# Patient Record
Sex: MALE | Race: WHITE
[De-identification: names, ages, dates, MRNs, and addresses within clinical notes are randomized per-mention and may not be internally consistent; named-entity substitution may affect disease eponyms.]

---

## 2022-10-02 ENCOUNTER — HOSPITAL ENCOUNTER (INPATIENT)
Dept: HOSPITAL 95 - ER | Age: 57
LOS: 4 days | Discharge: HOME | DRG: 189 | End: 2022-10-06
Attending: HOSPITALIST | Admitting: HOSPITALIST
Payer: COMMERCIAL

## 2022-10-02 VITALS — BODY MASS INDEX: 29.44 KG/M2 | WEIGHT: 210.32 LBS | HEIGHT: 71 IN

## 2022-10-02 DIAGNOSIS — Z86.73: ICD-10-CM

## 2022-10-02 DIAGNOSIS — F17.210: ICD-10-CM

## 2022-10-02 DIAGNOSIS — I10: ICD-10-CM

## 2022-10-02 DIAGNOSIS — Z79.899: ICD-10-CM

## 2022-10-02 DIAGNOSIS — Z20.822: ICD-10-CM

## 2022-10-02 DIAGNOSIS — Z71.6: ICD-10-CM

## 2022-10-02 DIAGNOSIS — J43.9: ICD-10-CM

## 2022-10-02 DIAGNOSIS — Z79.51: ICD-10-CM

## 2022-10-02 DIAGNOSIS — J96.01: Primary | ICD-10-CM

## 2022-10-02 DIAGNOSIS — J96.02: ICD-10-CM

## 2022-10-02 DIAGNOSIS — Z79.82: ICD-10-CM

## 2022-10-02 DIAGNOSIS — F41.9: ICD-10-CM

## 2022-10-02 LAB
ALBUMIN SERPL BCP-MCNC: 4.1 G/DL (ref 3.4–5)
ALBUMIN/GLOB SERPL: 1.2 {RATIO} (ref 0.8–1.8)
ALT SERPL W P-5'-P-CCNC: 27 U/L (ref 12–78)
ANION GAP SERPL CALCULATED.4IONS-SCNC: 8 MMOL/L (ref 6–16)
AST SERPL W P-5'-P-CCNC: 11 U/L (ref 12–37)
BASOPHILS # BLD AUTO: 0.1 K/MM3 (ref 0–0.23)
BASOPHILS NFR BLD AUTO: 1 % (ref 0–2)
BILIRUB SERPL-MCNC: 1.1 MG/DL (ref 0.1–1)
BUN SERPL-MCNC: 17 MG/DL (ref 8–24)
CALCIUM SERPL-MCNC: 9.3 MG/DL (ref 8.5–10.1)
CHLORIDE SERPL-SCNC: 108 MMOL/L (ref 98–108)
CO2 SERPL-SCNC: 24 MMOL/L (ref 21–32)
CREAT SERPL-MCNC: 1.19 MG/DL (ref 0.6–1.2)
DEPRECATED RDW RBC AUTO: 43.9 FL (ref 35.1–46.3)
EOSINOPHIL # BLD AUTO: 0.89 K/MM3 (ref 0–0.68)
EOSINOPHIL NFR BLD AUTO: 9 % (ref 0–6)
ERYTHROCYTE [DISTWIDTH] IN BLOOD BY AUTOMATED COUNT: 13 % (ref 11.7–14.2)
FLUAV RNA SPEC QL NAA+PROBE: NEGATIVE
FLUBV RNA SPEC QL NAA+PROBE: NEGATIVE
GLOBULIN SER CALC-MCNC: 3.3 G/DL (ref 2.2–4)
GLUCOSE SERPL-MCNC: 108 MG/DL (ref 70–99)
HCT VFR BLD AUTO: 48.5 % (ref 37–53)
HGB BLD-MCNC: 16.6 G/DL (ref 13.5–17.5)
IMM GRANULOCYTES # BLD AUTO: 0.04 K/MM3 (ref 0–0.1)
IMM GRANULOCYTES NFR BLD AUTO: 0 % (ref 0–1)
LYMPHOCYTES # BLD AUTO: 1.5 K/MM3 (ref 0.84–5.2)
LYMPHOCYTES NFR BLD AUTO: 14 % (ref 21–46)
MCHC RBC AUTO-ENTMCNC: 34.2 G/DL (ref 31.5–36.5)
MCV RBC AUTO: 91 FL (ref 80–100)
MONOCYTES # BLD AUTO: 0.51 K/MM3 (ref 0.16–1.47)
MONOCYTES NFR BLD AUTO: 5 % (ref 4–13)
NEUTROPHILS # BLD AUTO: 7.41 K/MM3 (ref 1.96–9.15)
NEUTROPHILS NFR BLD AUTO: 71 % (ref 41–73)
NRBC # BLD AUTO: 0 K/MM3 (ref 0–0.02)
NRBC BLD AUTO-RTO: 0 /100 WBC (ref 0–0.2)
PH BLDA: 7.47 [PH] (ref 7.35–7.45)
PLATELET # BLD AUTO: 238 K/MM3 (ref 150–400)
POTASSIUM SERPL-SCNC: 4.1 MMOL/L (ref 3.5–5.5)
PROT SERPL-MCNC: 7.4 G/DL (ref 6.4–8.2)
RSV RNA SPEC QL NAA+PROBE: NEGATIVE
SARS-COV-2 RNA RESP QL NAA+PROBE: NEGATIVE
SODIUM SERPL-SCNC: 140 MMOL/L (ref 136–145)

## 2022-10-02 PROCEDURE — A9270 NON-COVERED ITEM OR SERVICE: HCPCS

## 2022-10-02 NOTE — NUR
ASSUMED CARE:
 
MED FLOOR STAFF CALLED PCU CHARGE RN TO STATE THAT THEY NEEDED TO TRANSFER A
PT FOR RESPIRATORY DISTRESS. THIS RN WENT TO BEDSIDE AND PT WAS ON 3L NC,
RESPIRATORY RATE 30S-40S. ACCESSORY MUSCLE USE NOTED. TACHYCARDIA AND
HYPERTENSIVE. DR HU AT BEDSIDE AND INSTRUCTED FOR PCU TRANSFER, BIPAP AND A
DOSE OF MORPHINE. PT PLACED ON BIPAP 12/8 AND 40% FIO2. RR NOW IN 16-24. HR
REMAINS 120S. CALL TO DR HU AND RECIEVED ORDERS FOR LABETOLOL AND COVID
SWAB.  STATES PT'S XRAY DOES NOT SHOW NEED FOR ABX AND  FEELS THIS IS
EMPHYSEMA AND COPD EXACERBATION. WHILE DISCUSSING CODE STATUS PT STATED HE
WANTED TO BE DNR. STATES HE UNDERSTANDS THAT MEANS NO COMPRESSIONS, NO
BREATHING TUBE, NO SHOCK, NO MEDS TO GET HEART RESTARTED. PT STATES
UNDERSTANDING. CALL TO DR HU FOR ORDER CHANGE. PT CURRENTLY IN PCU BED, CALL
LIGHT IN REACH. TRANSFER VIA BED FROM MED FLOOR TO PCU WITH RT ASSISTING AND 3
RNS. CHARGE RN AWARE OF PT CONDITION.

## 2022-10-02 NOTE — NUR
PT ASKED THAT HIS SISTER GERHARD BE NOTIFIED OF TRANSFER. PT ALSO ASKED THAT HIS
NEICE BE MADE AWARE. GERHARD WAS GIVEN ROOM NUMBER AND UPDATE. STATES SHE WILL
CALL TO CHECK ON HIM IN AM.

## 2022-10-02 NOTE — NUR
REPORT FROM [facility] ER RN, PATIENT TO FLOOR AT 1705, RESPIRATORY DISTRESS, SATS
90% ON 3L O2, RESPIRATION 38, HEART RATE 120S, ACCESSORY MUSCLES USED, PATIENT
UNABLE TO TALK, SBP 200S. NOTIFIED CHARGE RN, PATIENT BEING TRANSFERED TO PCU
20, REPORT TO JACQUI PCU RN. DR HU NOTIFED, MORPHINE GIVEN, BIPAP STARTED, ABG
DONE, COVID SWAB SENT

## 2022-10-03 LAB
ANION GAP SERPL CALCULATED.4IONS-SCNC: 7 MMOL/L (ref 6–16)
BUN SERPL-MCNC: 32 MG/DL (ref 8–24)
CALCIUM SERPL-MCNC: 9.8 MG/DL (ref 8.5–10.1)
CHLORIDE SERPL-SCNC: 108 MMOL/L (ref 98–108)
CO2 SERPL-SCNC: 22 MMOL/L (ref 21–32)
CREAT SERPL-MCNC: 1.21 MG/DL (ref 0.6–1.2)
GLUCOSE SERPL-MCNC: 150 MG/DL (ref 70–99)
PH BLDA: 7.34 [PH] (ref 7.35–7.45)
PH BLDA: 7.35 [PH] (ref 7.35–7.45)
POTASSIUM SERPL-SCNC: 5 MMOL/L (ref 3.5–5.5)
SODIUM SERPL-SCNC: 137 MMOL/L (ref 136–145)

## 2022-10-03 NOTE — NUR
SHIFT SUMMARY
 
PT A&Ox4, CALLS AND COMMUNIACTES NEEDS APPROPRIATELY. SR-ST 's, BP
ELEVATED, MANAGING PER EMAR. SpO2>92% ON BIPAP 10/6 40%Fi02, SEE PREVIOUS RN's
NOTE. NO OTHER EVENTS THIS SHIFT, PT NOW RESTING COMFORTABLY. PT REMAINED
BEDREST THIS SHIFT D/T OXYGEN DEMAND. WILL REPORT TO DAY SHIFT RN.

## 2022-10-03 NOTE — NUR
UPDATE
PT IN ROOM W C/O FEELING AS THOUGH HE COULD NOT CATCH HIS BREATH DESPITE BEING
ON BIPAP. RT CONTACTED AND ASSESSED THE PT AT BEDSIDE. PT BEGAN HAVING A
PANICK ATTACK AND HIS BP ELEVATED W SBP >190. PROVIDER CONTACTED AND ASSESSED
THE PT AT BEDSIDE. NEW ORDERS FOR CHEST XRAY, STEROIDS, AND REPEAT ABG'S WERE
GIVEN. RT ABLE TO ADJUST THE BIPAP AND STABALIZE THE PT. DURING THIS EVENT THE
PT VERBALIZED HIS DESIRE TO BE A FULL CODE AND BE INTUBATED IF NEEDED. CODE
STATUS CHANGED. PT CURRENTLY RESTING IN BED REPORTING THAT HE FEELS MUCH
BETTER.

## 2022-10-03 NOTE — NUR
SHIFT SUMMARY
PT REMAINS ALERT AND ORIENTED. PT HAS BEEN OFF BIPAP AND ON 3L NC ALL SHIFT
WITH SATS >90%. PT REPORTS FEELING SOB AT TIMES, BUT BREATHING TREATMENTS
HELP. LS WHEEZES THROUGHOUT. BP STABLE. HR NSR. PT DENIES ANY PAIN. PT ABLE TO
VOID USING THE URINAL. PT REPOSITIONING HIMSELF INDEPENDENTLY. NO OTHER ACUTE
CHANGES THIS SHIFT. WILL CONTINUE TO MONITOR AND REPORT TO ONCOMING RN

## 2022-10-03 NOTE — NUR
ASSUMED CARE
PT ON BIPAP UPON ASSESSMENT WITH SETTINGS 10/6 AND 40% FIO2. RR IS THE MID
20'S. BP ELEVATED. HR NSR 90'S. PT HAS EXP WHEEZES THROUGHOUT. DRY COUGH
NOTED. PT DENIES ANY ESTRELLA. PT ABLE TO REPOSITION HIMSELF IN THE BED. PT
TRIALED ON 4L NC FOR BREAKFAST AND TOLERATING AT THIS TIME WITH SATURATION IN
THE MID 90'S. WILL CONTINUE TO MONITOR CLOSELY.

## 2022-10-04 LAB
ANION GAP SERPL CALCULATED.4IONS-SCNC: 7 MMOL/L (ref 6–16)
BUN SERPL-MCNC: 43 MG/DL (ref 8–24)
CALCIUM SERPL-MCNC: 9 MG/DL (ref 8.5–10.1)
CHLORIDE SERPL-SCNC: 107 MMOL/L (ref 98–108)
CO2 SERPL-SCNC: 24 MMOL/L (ref 21–32)
CREAT SERPL-MCNC: 1.26 MG/DL (ref 0.6–1.2)
GLUCOSE SERPL-MCNC: 150 MG/DL (ref 70–99)
POTASSIUM SERPL-SCNC: 4.7 MMOL/L (ref 3.5–5.5)
SODIUM SERPL-SCNC: 138 MMOL/L (ref 136–145)

## 2022-10-04 NOTE — NUR
ASSUMED CARE OF PT AT 1600. PT IS PLEASANT AND A&O. PT HAD FRIENDS AT
BEDSIDE IN THE AFTERNOON. PT HAS HAD NO COMPLAINTS OF PAIN OR SOB. BED IN
LOWEST POSITION AND CALL LIGHT IN REACH.

## 2022-10-04 NOTE — NUR
SHIFT SUMMARY
PT REMAINS ALERT AND ORIENTED. ATTEMPTED TO TITRATE PT TO ROOM AIR, BUT PT
DESATURATED. PT ON 2L NC. BP STABLE. PT DENIES ANY PAIN. PT ABLE TO TOLERATE
WALKING INTO THE BATHROOM AND TAKING A SHOWER. STATUS CHANGED TO MEDICAL.
REPORT GIVEN TO JELENA VALENTINE

## 2022-10-04 NOTE — NUR
SHFIT SUMMARY
 
PT A&Ox4, CALLS AND COMMUNICATES NEEDS APPROPRIATELY. VSS, SR 80's. SpO2> 92%
RA. PT DESATURATED TO 87% A FEW TIMES WHILE SLEEPING, SpO2 REMAINED > 90% WITH
2L WHILE ASLEEP. BLADDER SCAN SHOWED PT WAS RETAINING ABOUT 1,100mls OF URINE,
CALL TO MD WITH ORDERS TO STRAIGHT CATH AND ADMINISTER FLOWMAX. STRAIGHT CATH
WAS PREFORMED AND FLOW MAX ADMINISTERED, PT ABLE TO VOID AFTER THESE
INTERVENTIONS. NO ACUTE EVENTS. WILL REPORT TO DAY SHIFT RN.

## 2022-10-04 NOTE — NUR
Pt is sitting on the EOB and alert. Pt tells me about his medical problems and
his frustration he has with living with those problems. He talks about his
poor support system (He has a couple of sisters who both have similar health
issues), his disinterest in Restorationist, mindfulness practices or spiritual
practices and relies on his own inner strength to get through tough things. I
highlight that he has voer come many obstacles in the past and that strength
will serve him well now. I provide therapeutic listening, gentle  and a
calming presence. Pt voices appreciation for the visit.

## 2022-10-05 NOTE — NUR
PT RESTED QUIETLY FOR THE ENTIRETY OF THE SHIFT. IN THE MORNING PT WAS MILDLY
AGITATED THAT HE WAS NOT ABLE TO GO HOME TODAY AND MAY NEED TO SPEND MORE TIME
IN THE HOSPITAL, BUT HE IS AGREEABLE TO CONTINUE WITH HIS STAY. HIS VITALS
HAVE BEEN STABLE, LUNGS WERE NOTED TO HAVE EXPIRATORY WHEEZING. HE HAD ONE
EPISODE OF SOB, BREATHING TREATMENT ADMINISTERED AND SOB RESOLVED. HE HAS BEEN
ON 2L O2 NASAL CANNULA AND MAINTAINING NORMAL SATURATIONS. HE DENIES ANY PAIN.
BOWEL SOUNDS WERE HYPOACTIVE. IV PATENT AND WNL. HE HAS NO ACUTE NEEDS AT THIS
TIME AND CALL LIGHT IS WITHIN REACH.

## 2022-10-05 NOTE — NUR
PT DID NOT USE URINAL TO VOID SO OUTPUT IS NOT MEASURED. HE REPORTS THAT HE
VOIDED 4 TIMES THIS SHIFT.

## 2022-10-05 NOTE — NUR
NOC SHIFT SUMMARY
 
PT SLEPT WELL OVERNIGHT. NO COMPLAINTS OF PAIN OR DISCOMFORT. ON 2L NC. VSS
PER PT TREND. BREATHING TX PER RT. PT REPORTS VOIDING MULTIPLE TIMES
THROUGHOUT THE NIGHT BUT EDUCATED ON IMPORTANCE OF I/O COLLECTION AND PROVIDED
URINAL IN BATHROOM. PT VERBALIZED UNDERSTANDING.
 
WILL PASS ON TO DAY RN

## 2022-10-05 NOTE — NUR
OBTAINED VITAL SIGNS. VSS. PT HAD ONE EPIDOSE OF SOB AND WAS TACHYPNEIC. HE
RECEIVED A BREATHING TREATMENT AND THEN DENIED ANY SOB. MILD EXPIRATORY
WHEEZES NOTED UPON MORNING ASSESSMENT, ALL OTHER FINDINGS WNL. HE HAS NO ACUTE
NEEDS AT THIS TIME AND DENIES PAIN. CALL LIGHT WITHIN REACH.

## 2022-10-06 NOTE — NUR
NOC SHIFT SUMMARY
 
PT SLEPT OVERNIGHT, ORIENTED X4, VSS PER PT TREND. ON 2L NC. NO COMPLAINTS OF
PAIN OR DISCOMFORT. Q4 NEBS PER RT. IV SOLUMEDROL.
 
WILL PASS ON TO DAY RN

## 2022-10-06 NOTE — NUR
DISCHARGE SUMMARY
 
PT AMBULATED FROM FACILITY WITH THIS RN AS AN ESCORT. ALL PERSONAL BELONGINGS
AND DISCHARGE INSTRUCTIONS WERE IN THE PT'S POSSESSION AT THE TIME OF
DISCHARGE. ALL QUESTIONS AND CONCERNS WERE ADDRESSED PRIOR TO DISCHARGE. PT
STATED AN UNDERSTANDING OF DISCHARGE INSTRUCTIONS AND VERBALIZED WHERE THEY
COULD CONTACT WITH FURTHER QUESTIONS OR CONCERNS.

## 2022-12-12 ENCOUNTER — HOSPITAL ENCOUNTER (INPATIENT)
Dept: HOSPITAL 95 - ER | Age: 57
LOS: 9 days | Discharge: HOME | DRG: 193 | End: 2022-12-21
Attending: HOSPITALIST | Admitting: HOSPITALIST
Payer: COMMERCIAL

## 2022-12-12 VITALS — HEIGHT: 72 IN | WEIGHT: 208.34 LBS | BODY MASS INDEX: 28.22 KG/M2

## 2022-12-12 DIAGNOSIS — Z20.822: ICD-10-CM

## 2022-12-12 DIAGNOSIS — J44.1: ICD-10-CM

## 2022-12-12 DIAGNOSIS — R65.10: ICD-10-CM

## 2022-12-12 DIAGNOSIS — I10: ICD-10-CM

## 2022-12-12 DIAGNOSIS — Z79.51: ICD-10-CM

## 2022-12-12 DIAGNOSIS — F17.210: ICD-10-CM

## 2022-12-12 DIAGNOSIS — N40.0: ICD-10-CM

## 2022-12-12 DIAGNOSIS — Z86.73: ICD-10-CM

## 2022-12-12 DIAGNOSIS — Z71.6: ICD-10-CM

## 2022-12-12 DIAGNOSIS — F41.9: ICD-10-CM

## 2022-12-12 DIAGNOSIS — F10.20: ICD-10-CM

## 2022-12-12 DIAGNOSIS — J96.02: ICD-10-CM

## 2022-12-12 DIAGNOSIS — J10.1: Primary | ICD-10-CM

## 2022-12-12 DIAGNOSIS — Z79.01: ICD-10-CM

## 2022-12-12 DIAGNOSIS — I51.7: ICD-10-CM

## 2022-12-12 DIAGNOSIS — J96.01: ICD-10-CM

## 2022-12-12 DIAGNOSIS — K21.9: ICD-10-CM

## 2022-12-12 DIAGNOSIS — Z79.02: ICD-10-CM

## 2022-12-12 DIAGNOSIS — Z79.899: ICD-10-CM

## 2022-12-12 DIAGNOSIS — Z87.01: ICD-10-CM

## 2022-12-12 DIAGNOSIS — Z79.52: ICD-10-CM

## 2022-12-12 LAB
ANION GAP SERPL CALCULATED.4IONS-SCNC: 7 MMOL/L (ref 6–16)
BASOPHILS # BLD AUTO: 0.08 K/MM3 (ref 0–0.23)
BASOPHILS NFR BLD AUTO: 1 % (ref 0–2)
BUN SERPL-MCNC: 16 MG/DL (ref 8–24)
CA-I BLD-SCNC: 1.08 MMOL/L (ref 1.1–1.46)
CALCIUM SERPL-MCNC: 8.9 MG/DL (ref 8.5–10.1)
CHLORIDE BLD-SCNC: 104 MMOL/L (ref 98–108)
CHLORIDE SERPL-SCNC: 106 MMOL/L (ref 98–108)
CO2 BLD-SCNC: 22 MMOL/L (ref 21–32)
CO2 SERPL-SCNC: 24 MMOL/L (ref 21–32)
CREAT BLD-MCNC: 1.4 MG/DL (ref 0.8–1.3)
CREAT SERPL-MCNC: 1.38 MG/DL (ref 0.6–1.2)
DEPRECATED RDW RBC AUTO: 42.6 FL (ref 35.1–46.3)
EOSINOPHIL # BLD AUTO: 0.2 K/MM3 (ref 0–0.68)
EOSINOPHIL NFR BLD AUTO: 2 % (ref 0–6)
ERYTHROCYTE [DISTWIDTH] IN BLOOD BY AUTOMATED COUNT: 12.9 % (ref 11.7–14.2)
FLUAV RNA SPEC QL NAA+PROBE: POSITIVE
FLUBV RNA SPEC QL NAA+PROBE: NEGATIVE
GLUCOSE BLDC GLUCOMTR-MCNC: 96 MG/DL (ref 70–99)
GLUCOSE SERPL-MCNC: 104 MG/DL (ref 70–99)
HCT VFR BLD AUTO: 48.2 % (ref 37–53)
HCT VFR BLD CALC: 50 % (ref 41–53)
HGB BLD CALC-MCNC: 17 G/DL (ref 13.5–17.5)
HGB BLD-MCNC: 16.5 G/DL (ref 13.5–17.5)
IMM GRANULOCYTES # BLD AUTO: 0.03 K/MM3 (ref 0–0.1)
IMM GRANULOCYTES NFR BLD AUTO: 0 % (ref 0–1)
LYMPHOCYTES # BLD AUTO: 0.76 K/MM3 (ref 0.84–5.2)
LYMPHOCYTES NFR BLD AUTO: 7 % (ref 21–46)
MCHC RBC AUTO-ENTMCNC: 34.2 G/DL (ref 31.5–36.5)
MCV RBC AUTO: 90 FL (ref 80–100)
MONOCYTES # BLD AUTO: 1.03 K/MM3 (ref 0.16–1.47)
MONOCYTES NFR BLD AUTO: 10 % (ref 4–13)
NEUTROPHILS # BLD AUTO: 8.3 K/MM3 (ref 1.96–9.15)
NEUTROPHILS NFR BLD AUTO: 80 % (ref 41–73)
NRBC # BLD AUTO: 0 K/MM3 (ref 0–0.02)
NRBC BLD AUTO-RTO: 0 /100 WBC (ref 0–0.2)
PH BLDV: 7.36 [PH] (ref 7.34–7.37)
PH BLDV: 7.37 [PH] (ref 7.34–7.37)
PLATELET # BLD AUTO: 180 K/MM3 (ref 150–400)
POTASSIUM BLD-SCNC: 4.2 MMOL/L (ref 3.5–5.5)
POTASSIUM SERPL-SCNC: 4.2 MMOL/L (ref 3.5–5.5)
RSV RNA SPEC QL NAA+PROBE: NEGATIVE
SARS-COV-2 RNA RESP QL NAA+PROBE: NEGATIVE
SODIUM BLD-SCNC: 138 MMOL/L (ref 135–148)
SODIUM SERPL-SCNC: 137 MMOL/L (ref 136–145)

## 2022-12-12 PROCEDURE — C9113 INJ PANTOPRAZOLE SODIUM, VIA: HCPCS

## 2022-12-12 PROCEDURE — 5A09457 ASSISTANCE WITH RESPIRATORY VENTILATION, 24-96 CONSECUTIVE HOURS, CONTINUOUS POSITIVE AIRWAY PRESSURE: ICD-10-PCS | Performed by: HOSPITALIST

## 2022-12-12 PROCEDURE — A9270 NON-COVERED ITEM OR SERVICE: HCPCS

## 2022-12-12 NOTE — NUR
LETI JUST POPPED OFF HIS MASK AND JUMPED UP OUT OF BED TO USE THE URINAL, WAS
ABLE TO GET HIM TO HOLD FOR ASSISTANCE DUE TO CORDS. HE DID NOT HAVE MASK ON
DUE TO BEING ALL TANGLED. IN THE TIME HE USED THE URINAL HIS SATS DROPPED DOWN
TO 70%. IT TOOK HIM 3 MINUTES TO FULLY RECOVER TO 99% BUT HE STILL IS VERY
LABORED BREATHING HAVING TO POSTURE UPRIGHT AND FORWARD. STATES HE IS NOT ABLE
TO LEAN BACK YET. RT IN ROOM TO GIVE BREATHING TREATMENT.

## 2022-12-12 NOTE — NUR
ICU ADMISSION / SHIFT SUMMARY:
REPORT RECEIVED FROM JOSE LUIS SORENSON RN IN ED. PT ARRIVED TO ICU-02 AT APPROX 1700.
ON ARRIVAL, THE PT IS VISIBLY DYSPNEIC & USING ACCESSORY MUSCLES WHILE
BREATHING. HE IS REQUESTING THAT BIPAP BE REMOVED & REPEATING THAT HE IS "TOO
HOT." ATIVAN GIVEN PER EMAR IN ED W/ SOME RELIEF OF ANXIETY. BIPAP IN PLACE
W/ SETTINGS: 14/7 & 35% FIO2, O2 SATS > 92% ON AVG.  EXPIRATORY WHEEZING NOTED
T/O ON AUSCULTATION. MONITOR SHOWS ST W/ -120s, HTN INCREASED W/
ANXIETY. ABDOMEN IS DISTENDED, TENDER TO PALPATION. PT STS HAVING NO BM "FOR A
FEW DAYS." STS NO ISSUES W/ VOIDING URINE, NO VOID SINCE ADMIT. SKIN OVERALL
INTACT, DIAPHORESIS WORSENED W/ ANXIETY.
PT HAS REMOVED BIPAP W/O NOTIFYING STAFF, STS HE "NEEDED A BREAK." O2 SATS
MAINTAINING 92% BUT THE PT's WORK OF BREATHING & ACCESSORY MUSCLE USE HAS
INCREASED DURING THIS TIME. HE IS TRIPODING & STS BEING UNABLE TO BREATH.
WHEEZING IS AUDIBLE FROM THE DOORWAY. THIS RN HAS PLACED BIPAP BACK ON THE PT
& CONTACTED NAHID CARLSON, RT, FOR A BREATHING TX. UPON FURTHER EVALUATION, THIS RN
HAS CONTACTED HOSPITALIST PROVIDER REQUESTING INTENSIVIST CONSULTATION. ORDERS
PLACED & DR HIGUERA HAS BEEN NOTIFIED. UPDATED HER ON COMPLETED INTERVENTIONS.
ORDERS FOR PRECEDEX & TAMIFLU PLACED AT THIS TIME.
WILL CONTINUE TO MONITOR & REPORT OFF TO ONCOMING RN.

## 2022-12-12 NOTE — NUR
ASSUMED CARE. PT ALERT WITH BIPAP IN PLACE, SETTINGS 12/7 FIO2 35%, RESP RATE
IN THE 30'S, LABORED, SATS 98%. RT WAS IN ROOM STATES SEVERE WHEEZEING NOTED.
SO FAR TOLERATING BIPAP, PRECEDEX GTT AT 0.4MCQ. LR INFUSING AT 75ML/HR.
STATES THE WORK OF BREATHING COMES AND GOES. SINUS ON MONITOR, BP HYPERTENSIVE
228/215, WILL CALL MD REGARDING PRESSURES.

## 2022-12-12 NOTE — NUR
SISTER NALDO CALLED BACK AND INFORMED OF PATIENTS UPDATE PER PATIENT
VERBALIZED CONSENT. PRECEDEX HAS KICKED IN AND PATIENT IS STARTING TO REST.
STILL LABORED BREATHING IN THE 30'S BUT TOLERATING THE BIPAP EASIER. BP HAS
COME DOWN TO /80'S, SATS AT 97%. WILL CONTINUE TO MONITOR

## 2022-12-13 LAB
ALBUMIN SERPL BCP-MCNC: 3.5 G/DL (ref 3.4–5)
ALBUMIN/GLOB SERPL: 0.9 {RATIO} (ref 0.8–1.8)
ALT SERPL W P-5'-P-CCNC: 23 U/L (ref 12–78)
ANION GAP SERPL CALCULATED.4IONS-SCNC: 7 MMOL/L (ref 6–16)
AST SERPL W P-5'-P-CCNC: 18 U/L (ref 12–37)
BASOPHILS # BLD AUTO: 0.01 K/MM3 (ref 0–0.23)
BASOPHILS NFR BLD AUTO: 0 % (ref 0–2)
BILIRUB SERPL-MCNC: 0.6 MG/DL (ref 0.1–1)
BUN SERPL-MCNC: 29 MG/DL (ref 8–24)
CALCIUM SERPL-MCNC: 8.7 MG/DL (ref 8.5–10.1)
CHLORIDE SERPL-SCNC: 106 MMOL/L (ref 98–108)
CO2 SERPL-SCNC: 24 MMOL/L (ref 21–32)
CREAT SERPL-MCNC: 1.12 MG/DL (ref 0.6–1.2)
DEPRECATED RDW RBC AUTO: 43.7 FL (ref 35.1–46.3)
EOSINOPHIL # BLD AUTO: 0 K/MM3 (ref 0–0.68)
EOSINOPHIL NFR BLD AUTO: 0 % (ref 0–6)
ERYTHROCYTE [DISTWIDTH] IN BLOOD BY AUTOMATED COUNT: 12.9 % (ref 11.7–14.2)
GLOBULIN SER CALC-MCNC: 3.7 G/DL (ref 2.2–4)
GLUCOSE SERPL-MCNC: 148 MG/DL (ref 70–99)
HCT VFR BLD AUTO: 46 % (ref 37–53)
HGB BLD-MCNC: 15.7 G/DL (ref 13.5–17.5)
IMM GRANULOCYTES # BLD AUTO: 0.01 K/MM3 (ref 0–0.1)
IMM GRANULOCYTES NFR BLD AUTO: 0 % (ref 0–1)
LYMPHOCYTES # BLD AUTO: 0.31 K/MM3 (ref 0.84–5.2)
LYMPHOCYTES NFR BLD AUTO: 4 % (ref 21–46)
MAGNESIUM SERPL-MCNC: 2.5 MG/DL (ref 1.6–2.4)
MCHC RBC AUTO-ENTMCNC: 34.1 G/DL (ref 31.5–36.5)
MCV RBC AUTO: 92 FL (ref 80–100)
MONOCYTES # BLD AUTO: 0.14 K/MM3 (ref 0.16–1.47)
MONOCYTES NFR BLD AUTO: 2 % (ref 4–13)
NEUTROPHILS # BLD AUTO: 6.9 K/MM3 (ref 1.96–9.15)
NEUTROPHILS NFR BLD AUTO: 94 % (ref 41–73)
NRBC # BLD AUTO: 0 K/MM3 (ref 0–0.02)
NRBC BLD AUTO-RTO: 0 /100 WBC (ref 0–0.2)
PHOSPHATE SERPL-MCNC: 4.9 MG/DL (ref 2.5–4.9)
PLATELET # BLD AUTO: 156 K/MM3 (ref 150–400)
POTASSIUM SERPL-SCNC: 5.3 MMOL/L (ref 3.5–5.5)
PROT SERPL-MCNC: 7.2 G/DL (ref 6.4–8.2)
SODIUM SERPL-SCNC: 137 MMOL/L (ref 136–145)

## 2022-12-13 NOTE — NUR
SHIFT SUMMARY: MILDLY SEDATED ON PRECEDEX GTT OF 0.4MCQ WHICH HAS HELPED WITH
ANXIETY. STILL HAD TO GIVE 1 DOSE OF 1MG ATIVAN THIS MORNING TO HELP. HE WAS
IMPULSIVE 1 TIME RIPPING OFF BIPAP TO GET UP AND USE BATHROOM, WAS OFF BIPAP
ONLY FOR FEW MIN, DROPPING SATS DOWN TO 70%. IT TOOK HIM SEVERAL MINUTES TO
GET CAUGHT BACK UP. CONTINUES TO HAVE VERY AUDIBLE WHEEZES EVEN ON BIPAP.
SETTINGS WERE INCREASED TO 16/7/25% TO KEEP SATS >95%. STILL NO SPUTUM FOR
CULTURE. ONLY URINATED ONCE WHICH WAS VERY DARK CHRISTINA. HE DID NEED NEBULIZER
TREATMENTS CONSISTENT T/O NIGHT. RESP REMAIN IN THE HIGH 28-35. NEW IV PLACED
IN RIGHT AC, HAND IV DC'D. WILL REPORT TO DAYSHIFT.

## 2022-12-13 NOTE — NUR
ASSUMED CARE OF PATIENT, HE IS RESTING QUIETLY ON PRECEDEX @ 0.4MCG/KG, IV
FLUIDS, BIPAP 16/7 30%. DENIES ANY NEEDS AT THIS TIME. SEE ASSESSMENT.

## 2022-12-13 NOTE — NUR
Ashe of Care:
 
Care assumed at 0700hr. Patient sleeping, but easily roused to verbal stimuli,
becomes a/o x4. On BiPAP at 16/7/35%, spO2-%. States breathing feels
comfortable on BiPAP. Then gave patient break from BiPAP mask to 4L/NC.
Patient tolerated break for approx 1 1/2 hr, before becoming SOB and
SpO2-70's. Patient placed back on BiPAP mask and quickly recovered. PRN ativan
given with good effect. All other VSS. Call light in reach, makes needs known.
Will continue to monitor.

## 2022-12-13 NOTE — NUR
ASSUMPTION OF CARE
 
PT IS ALERT, ON CONTINUOUS BIPAP. PRECEDEX GTT AT 0.4. SR ON THE CARDIAC
MONITOR. BP WNL. OXYGEN SAT >95% AT THIS TIME. HE IS ORIENTED AND ABLE TO
FOLLOW COMMANDS. HE DENIES PAIN AT THE TIME OF ASSUMPTION OF CARE. HE IS
AFEBRILE.

## 2022-12-14 LAB
ANION GAP SERPL CALCULATED.4IONS-SCNC: 5 MMOL/L (ref 6–16)
BASOPHILS # BLD AUTO: 0.01 K/MM3 (ref 0–0.23)
BASOPHILS NFR BLD AUTO: 0 % (ref 0–2)
BUN SERPL-MCNC: 37 MG/DL (ref 8–24)
CALCIUM SERPL-MCNC: 8.6 MG/DL (ref 8.5–10.1)
CHLORIDE SERPL-SCNC: 108 MMOL/L (ref 98–108)
CO2 SERPL-SCNC: 26 MMOL/L (ref 21–32)
CREAT SERPL-MCNC: 0.98 MG/DL (ref 0.6–1.2)
DEPRECATED RDW RBC AUTO: 44.1 FL (ref 35.1–46.3)
EOSINOPHIL # BLD AUTO: 0 K/MM3 (ref 0–0.68)
EOSINOPHIL NFR BLD AUTO: 0 % (ref 0–6)
ERYTHROCYTE [DISTWIDTH] IN BLOOD BY AUTOMATED COUNT: 13 % (ref 11.7–14.2)
GLUCOSE SERPL-MCNC: 167 MG/DL (ref 70–99)
HCT VFR BLD AUTO: 45.5 % (ref 37–53)
HGB BLD-MCNC: 15 G/DL (ref 13.5–17.5)
IMM GRANULOCYTES # BLD AUTO: 0.04 K/MM3 (ref 0–0.1)
IMM GRANULOCYTES NFR BLD AUTO: 0 % (ref 0–1)
LYMPHOCYTES # BLD AUTO: 0.59 K/MM3 (ref 0.84–5.2)
LYMPHOCYTES NFR BLD AUTO: 6 % (ref 21–46)
MCHC RBC AUTO-ENTMCNC: 33 G/DL (ref 31.5–36.5)
MCV RBC AUTO: 92 FL (ref 80–100)
MONOCYTES # BLD AUTO: 0.39 K/MM3 (ref 0.16–1.47)
MONOCYTES NFR BLD AUTO: 4 % (ref 4–13)
NEUTROPHILS # BLD AUTO: 9.66 K/MM3 (ref 1.96–9.15)
NEUTROPHILS NFR BLD AUTO: 90 % (ref 41–73)
NRBC # BLD AUTO: 0 K/MM3 (ref 0–0.02)
NRBC BLD AUTO-RTO: 0 /100 WBC (ref 0–0.2)
PLATELET # BLD AUTO: 162 K/MM3 (ref 150–400)
POTASSIUM SERPL-SCNC: 5.1 MMOL/L (ref 3.5–5.5)
SODIUM SERPL-SCNC: 139 MMOL/L (ref 136–145)

## 2022-12-14 NOTE — NUR
SHIFT SUMMERY
 
THERE HAVE BEEN NO ACUTE CHANGES OVERNIGHT. PT CONTINUES ON CONTINUOUS BIPAP
W/PRECEDEX GTT AT 0.4. HE IS SR ON THE MONITOR W/BP WNL. HE HAS BEEN AFEBRILE.
HE IS DROWSY BUT WAKES TO VERBAL STIMULI AND HIS CONVERSATION IS APPROPRIATE.
HE IS ABLE TO FOLLOW COMMANDS AND MAKE NEEDS KNOWN. HE VOIDS IN THE URINAL
W/OUT ASSISTANCE AND IS ABLE TO REPOSITION HIMSELF IN BED INDEPENDENTLY AT
THIS TIME.

## 2022-12-14 NOTE — NUR
LETI HAS BEEN OFF HIS BIPAP SINCE AROUND 1100, HE HAS BEEN TOELRATING WELL. HE
IS CURRENTLY TRYING TO EAT HIS LUNCH, SAYS HE IS TAKING IT SLOW AS HE GETS
WINDED. HIS SATS ARE 97%, /84, RESP 20 WITH SOME WORK OF BREATHING. HE
CONTINUES AT 0.6MCG/KG PRECEDEX, DOESN'T WANT IT DECREASED AT THIS TIME.

## 2022-12-14 NOTE — NUR
Pasha finished his lunch, ate very well. He was beginning to tire and have
audible wheezes, he was placed back on his bipap. 16/7 30%.

## 2022-12-14 NOTE — NUR
PT FEELING SLIGHTLY EXASPERATED, EXPRESSING DISAPPOINTMENT IN HIS INABILITY TO
TAKE DEEP BREATHS. HE FEELS LIKE AT THIS POINT HE SHOULD BE ABLE TO.
ENCOURAGED TO TAKE DEEP BREATHS AND EXERCISE HIS LUNGS AND EACH TIME WILL GET
BETTER. PT ASKING FOR THROAT LOZENGES FOR HIS SORE THROAT.

## 2022-12-14 NOTE — NUR
PT OFF THE BIPAP FOR A BREAK, AM MEDS AND ORAL INTAKE GIVEN. PT STATES THAT HE
IS BEGINNING TO FEEL A LITTLE BIT ANXIOUS WITH HIS BREATHING, PRECEDEX
INCREASED TO 0.6MCG/KG. PT REFUSED ORAL CARE.

## 2022-12-14 NOTE — NUR
[facility] JUST CALLED AND SAID THAT THE SPUTUM SAMPLE FROM THE PATIENT
WASN'T DEEP ENOUGH FOR THEIR CRITERIA AND THEY WILL NEED ANOTHER SPECIMEN.

## 2022-12-14 NOTE — NUR
Pasha wakes easily upon voice. The bipap is removed and he is given a break.
taking in water, asking for coffee with cream. Precedex on @ 0.4mcg/kg. Pt
still with audible wheezing. Work of breathing at rest, pt able to answer
questions in a full sentence. Refused oral care at this time. face and neck
washed. able to make his needs known.

## 2022-12-14 NOTE — NUR
PT VOIDED AND WAS UNABLE TO "CATCH" HIS BREATH. POSITIVE REINFORCEMENT GIVEN.
THEN IT WAS DINNER TIME, HE WAS ON THE NC FOR <5 MINUTES AND HE SAID HE NEEDED
BACK ON THE BIPAP. HIS WORK OF BREATHING HAD INCREASED. HIS SATS AND HEART
RATE WERE UNCHANGED. HE ASKED FOR ADDITIONAL MEDS AND 1MG OF ATIVAN WAS GIVEN.
A COLD WASHCLOTH WAS PUT OVER HIS HEAD, PER HIS REQUEST. DINNER TRAY HELD.

## 2022-12-14 NOTE — NUR
LETI IS RESTING WITH BIPAP IN PLACE, DENIES ANY COMPLAINTS. PRECEDEX CONTINUES
AT 0.6MCG/KG, LR @ 75ML/HR.

## 2022-12-15 LAB
ANION GAP SERPL CALCULATED.4IONS-SCNC: 5 MMOL/L (ref 6–16)
BASOPHILS # BLD AUTO: 0.03 K/MM3 (ref 0–0.23)
BASOPHILS NFR BLD AUTO: 0 % (ref 0–2)
BUN SERPL-MCNC: 38 MG/DL (ref 8–24)
CALCIUM SERPL-MCNC: 8.7 MG/DL (ref 8.5–10.1)
CHLORIDE SERPL-SCNC: 106 MMOL/L (ref 98–108)
CO2 SERPL-SCNC: 27 MMOL/L (ref 21–32)
CREAT SERPL-MCNC: 0.99 MG/DL (ref 0.6–1.2)
DEPRECATED RDW RBC AUTO: 42.7 FL (ref 35.1–46.3)
EOSINOPHIL # BLD AUTO: 0.01 K/MM3 (ref 0–0.68)
EOSINOPHIL NFR BLD AUTO: 0 % (ref 0–6)
ERYTHROCYTE [DISTWIDTH] IN BLOOD BY AUTOMATED COUNT: 12.8 % (ref 11.7–14.2)
GLUCOSE SERPL-MCNC: 184 MG/DL (ref 70–99)
HCT VFR BLD AUTO: 45.5 % (ref 37–53)
HGB BLD-MCNC: 15.4 G/DL (ref 13.5–17.5)
IMM GRANULOCYTES # BLD AUTO: 0.09 K/MM3 (ref 0–0.1)
IMM GRANULOCYTES NFR BLD AUTO: 1 % (ref 0–1)
LYMPHOCYTES # BLD AUTO: 0.52 K/MM3 (ref 0.84–5.2)
LYMPHOCYTES NFR BLD AUTO: 4 % (ref 21–46)
MCHC RBC AUTO-ENTMCNC: 33.8 G/DL (ref 31.5–36.5)
MCV RBC AUTO: 92 FL (ref 80–100)
MONOCYTES # BLD AUTO: 0.27 K/MM3 (ref 0.16–1.47)
MONOCYTES NFR BLD AUTO: 2 % (ref 4–13)
NEUTROPHILS # BLD AUTO: 10.8 K/MM3 (ref 1.96–9.15)
NEUTROPHILS NFR BLD AUTO: 92 % (ref 41–73)
NRBC # BLD AUTO: 0.02 K/MM3 (ref 0–0.02)
NRBC BLD AUTO-RTO: 0.2 /100 WBC (ref 0–0.2)
PLATELET # BLD AUTO: 135 K/MM3 (ref 150–400)
POTASSIUM SERPL-SCNC: 5.1 MMOL/L (ref 3.5–5.5)
SODIUM SERPL-SCNC: 138 MMOL/L (ref 136–145)

## 2022-12-15 NOTE — NUR
SHIFT SUMMERY
 
PT HAS HAD NO ACUTE CHANGES OVERNIGHT. HE HAS WORN THE BIPAP FOR MOST OF THE
NIGHT, TAKING ONLY ABOUT AN HOUR BREAK AND THEN REQUESTING THAT THE MASK BE
PUT BACK ON. HE CONTINUES TO HAVE PRECEDEX AT 0.6 FOR COMFORT. HE IS DROWSY
BUT AWAKENS EASILY AND IS ABLE TO MAKE NEEDS KNOWN.

## 2022-12-15 NOTE — NUR
SHIFT SUMMARY:
NEURO: wnl
RESPIRATORY: pt tolerated two short breaks off BIPAP on 3L NC today while
eating.
CARDIAC: wnl
GI/: no BM. good clear, yellow UOP with urinal. tolerated breakfast and
dinner well
SKIN: no new breakdown noted this shift. pt able to reposition himself in bed
with minimal assistance, though he does get dyspnic
PSYCH/SOCIAL: precedex continues at 0.6 with one dose of PRN ativan given
today. Pt's sister Kiesha was given an update over the phone today.

## 2022-12-15 NOTE — NUR
ASSUMED CARE OF PT @1900 FROM COLETTE VALENTINE. PT RESTING IN BED WITH CONTINUOUS
BIPAP. RR 20'S, SPO2 >92%. PT A&O X4. STATES HE IS NOT IN PAIN AND HIS ANXIETY
COMES AND GOES. PRECEDEX 0.6MCG/KG/HR. PG FRENCH PATENT AND INFUSING. IV LAC
PATENT W/SALINE LOCK. CONTINUOUS CARDIAC MONITORING. HR 90'S, -120'S,
MAP >65.

## 2022-12-16 NOTE — NUR
SHIFT SUMMARY:
precedex titrated off and pt changed to PCU status. He is tolerating breaks
from BIPAP on 3L NC for meals. Pt receiving PRN ativan q4h for anxiety.

## 2022-12-16 NOTE — NUR
PT REPORTS BEING WIDE AWAKE AT THIS TIME. REQUESTING SLEEP AID. CALL PLACED TO
HOSPITALIST; AWAITING RESPONSE.

## 2022-12-16 NOTE — NUR
SUMMARY
NO ACUTE EVENTS THIS SHIFT. PT TOLERATED BIPAP OVERNIGHT. GEL PAD IN PLACE ON
BRIDGE OF NOSE TO PREVENT SKIN BREAKDOWN. SWITCHED TO 3L O2 VIA NC @0500. PT
STATED THAT HE "FELT PRETTY GOOD" THROUGHOUT SHIFT AND WAS RESTFUL. RR 16-20,
SPO2 >92%. CONTINUOUS CARDIAC MONITORING. HR 60'S, SBP STABLE, MAP >65.
PRECEDEX 0.6MCG/KG/HR. PT DID NOT REQUIRE PRN ATIVAN. A&O X4, ABLE TO EXPRESS
NEEDS APPROPRIATLY. URINE OUTPUT 900MLS THIS SHIFT. REMAINDER OF ASSESSMENTS
REMAIN UNCHANGED.

## 2022-12-17 NOTE — NUR
PT UTILIZED CALL SYSTEM TO NOTIFY RN THAT HE FEELS "SHORT OF BREATH." PT
RESPIRATIONS AUDIBLY WHEEZY AND TACHYPNEIC. SATS 90% ON RA, BUT PT LABORING TO
BREATHE. O2 PLACED AT 2 LITERS NASAL CANULA. RT AT BEDSIDE TO GIVE PRN
BREATHING TX. PT MED WITH ATIVAN 1 MG IVP X 1. PT STATES THAT HE IS HOT AND HE
APPEARS DIAPHORETIC-FAN PROVIDED. PT VERBALIZING THAT HE WANTS TO GET OOB TO
CHAIR ONCE HER CATCHES UP ON HIS BREATH. PT STATES THAT HE WILL CALL WHEN HE
IS READY TO GET OOB.

## 2022-12-17 NOTE — NUR
PT DENIES COMPLAINTS AT THIS TIME. 'S/90'S. TEMP 99.0-PT DENIES CHILLS
AND/OR BODY ACHES. ECG CONTINUES SR. LUNGS REMAIN DIMINISHED AND TIGHT, BUT
FEWER WHEEZES AUSCULTATED. SATS>90% ON 1 LITER NASAL CANULA. NO NOTED SOB AT
REST. NO NOTED COUGH AT THIS TIME. PT REPOSITIONING HIMSELF IN BED AND MAKING
HIS NEEDS KNOW. CALL LIGHT WITHIN REACH.

## 2022-12-17 NOTE — NUR
NIGHT SHIFT SUMMARY
 
PT WAS AN ICU TRANSFER AROUND 0000. HE IS ALERT AND ORIENTED X4, INDEPENDENT
WITH THE URINAL, CALLS APPROPRIATELY. PT ON BIPAP THROUGHOUT THE NIGHT -
SETTINGS OF 12/6 AT 30% FIO2. HE IS CURRENTLY ON A NC AT 3L DUE TO A SORE TO
THE BRIDGE OF HIS NOSE FROM BIPAP. PT AGREEABLE TO NOTIFYING STAFF IF HE
BECOMES SHORT OF BREATH. PT SATTING >92% ON 3L. HE HAS BEEN SINUS IN THE 90S
UP TO THE 110S WITH INCREASING ANXIETY. PT ABLE TO MANAGE ANXIETY ON HIS OWN
THIS SHIFT. HE IS VERY RESTLESS, WATCHING TV - STATES THIS HAS BEEN NORMAL FOR
HIM OVER THE PAST FEW YEARS. HE DENIES ANY PAIN THIS SHIFT.

## 2022-12-17 NOTE — NUR
PT SITTING UP IN BED AND WATCHING TV WITHOUT NOTED DISTRESS. PT IS NO LONGER
AUDIBLY WHEEZY AND REPORTS THAT HIS WOB HAS IMPROVED. SATS>90% ON 1 LITER
NASAL CANULA. RN OFFERED TO ASSIST PT OOB TO CHAIR. PT DECLINES AT THIS TIME.
PT REQUESTED A CUP OF COFFEE. REQUEST GRANTED. CALL LIGHT WITHIN REACH. PT
AGREES TO CALL FOR ASSIST PRN.

## 2022-12-17 NOTE — NUR
PT A&OX4. DENIES PAIN AT THIS TIME. ECG SHOWS SR WITH RATE 80'S. BP
150/100'S-SCHEDULED ANTIHYPERTENSIVE MEDS GIVEN-SEE EMAR. NO NOTED EDEMA.
DP/PT PULSES 2+. LUNGS DECREASED AND TIGHT WITH INSPIRATORY AND EXPIRATORY
WHEEZES THROUGH OUT. OCCASIONAL, NONPRODUCTIVE COUGH. SOB WITH EXERTION.
SATS>90% ON 1 LITER NASAL CANULA. PT HAS A SCAB ON HIS NOSE FROM THE BIPAP
MASK-CLEASED AND MEPITEL DRESSING APPLIED. PT DENIES GI DISTRESS. PT VOIDS AND
REPOSITIONS HIMSELF INDEPENDENTLY. CALL LIGHT WITHIN REACH. PT AGREES TO CALL
FOR ASSIST PRN.

## 2022-12-18 NOTE — NUR
ASSUMPTION OF CARE/ASSESSMENT:
 
ASSUMED CARE OF PT AT 1900. PT ON RA WITH SPO2 90< WHEN THIS RN AT BEDSIDE TO
ASSESS PT. PT WAS HAVING C/O SOB AND REQUESTED TO BE PLACED BACK ON BIPAP AT
THIS TIME. PT REQUESTED PRN XANAX TO HELP HIM RELAX AND GO TO SLEEP. PT LUNG
SOUNDS HAVE INSPIRATORY WHEEZES IN ALL LUNG FIELDS; TACHYPNEIC WITH RR 22-26
AND SPO2 092< WITH BIPAP MASK ON. PT HR 80'S, -130, AND DENIES CHEST
PAIN AT THIS TIME. HYPERACTIVE BS IN ALL QUADRANTS; PT USING URINAL AT BEDSIDE
INDEPENDENTLY. PT A&O X 4; PT APPEARS IRRITATED AND AGGITATED R/T ADMISSION
BUT COOPERATIVE WITH CARE. PT USING CALL LIGHT APPROPRIATELY, BED LOWERED,
WILL CONTINUE TO MONITOR.

## 2022-12-18 NOTE — NUR
PT A&O X 4. DENIES CP. ECG SHOWS SR WITH RATE 80'S. 'S. NO NOTED EDEMA.
DP/PT PULSES 2+ LUNGS REMAIN TIGHT AND WHEEZY THROUGH OUT. SATS>90%ON RA. PT
REPORTS DRY THROAT FROM BIPAP-GIVEN CLORASEPTIC SPRAY. RR 22-28. PT DENIES GI
DISTRESS. PT VOIDS VIA URINAL AND URINAL IN PT REACH. PT OFFERED AM CARE AND
BATHING. PT DECLINES ALL CARES AT THIS TIME. PT STATES " I AM TOO SHORT OF
BREATH." BIPAP MASK PLACED PER PT REQUEST. PT AGREES TO CALL FOR ASSIST PRN.
CALL LIGHT WITHIN REACH.

## 2022-12-18 NOTE — NUR
PT GIVEN BREAK FROM BIPAP. SATS>90% ON RA. PT GIVEN COOL WASH CLOTH TO WASH
HIS FACE PER REQUEST. LUNGS REMAIN TIGHT AND WHEEZY THROUGH OUT-AUDIBLY WHEEZY
AT TIMES. PT JUST RECIEVED NEB IN LINE WITH BIPAP-SEE EMAR. CALL LIGHT IN
REACH. PT AGREES TO CALL FOR ASSIST PRN.

## 2022-12-18 NOTE — NUR
PT RESTING QUIETLY ON BIPAP WHEN NOT DISTURBED. PT AWAKENS TO VOICE AND DENIES
COMPLAINTS. VSS. LUNGS REMAIN TIGHT, WHEEZY, AND DIMINISHED THROUGH OUT. SATS
93% CALL LIGHT IN REACH. PT AGREES TO CALL FOR ASSIST PRN.

## 2022-12-18 NOTE — NUR
PT % OF LUNCH AND MAINTAINED SATS>90% ON RA. AFTER LUNCH, PT REPORTS
FEELING BOTH SOB AND ANXIOUS. PT MED WITH XANAX PO-SEE EMAR. BIPAP PLACED PER
PT REQUEST.

## 2022-12-18 NOTE — NUR
NIGHT SHIFT SUMMARY
 
PT HAS BEEN ON 1L BY NC THROUGHOUT THE SHIFT AND AT TIMES ON RA AND SATTING
>91%. HE BEGAN TO FEEL SHORT OF BREATH ON RA WITH INCREASED WORK OF BREATHING
THIS MORNING SO PT WAS PLACED BACK ON BIPAP TO REST. BP ELEVATED BUT IMPROVED
WITH NORMAL HOME MEDICATION. HE IS ALERT AND ORIENTED, INDEPENDENT WITH THE
URINAL. SPRAY USED ONCE THIS SHIFT FOR SORE THROAT. DENIES PAIN.

## 2022-12-19 NOTE — NUR
SHIFT SUMMARY:
 
NO ACUTE CHANGES THIS SHIFT. PT WAS OM BIPAP MASK FOR MOST OF THE NIGHT; PT
SWITCHED OVER TO NC @ 2LPM AT 0400 AND SPO2 94<. PT WASHED FACED AND BRUSHED
TEETH AT THIS TIME. VSS STABLE THROUGHOUT THE NIGHT. WILL CONTINUE TO MONITOR
UNTIL ONCOMING RN ARRIVES.

## 2022-12-19 NOTE — NUR
END OF SHIFT SUMMARY
 
PT ALERT AND ORIENTED, FRUSTRATED AT TIMES ABOUT BEING SOB, IS ANXIOUS. NO PRN
ANTIANXIETY MED ADMINISTERED THIS SHIFT ALTHOUGH PT DOES HAVE IT ORDERED PRN.
SINUS RHYTHM, BP STABLE. PT IS MEDICAL WITH TELE. LUNGS COARSE AT TIMES WITH
EXPIRATORY WHEEZING, C/O TIGHT BREATHING, GETS RT TREATMENTS PRN, IS ON 2L NC
WITH O2 SATS 96%. REGULAR DIET ORDERED, % OF ALL MEALS. VOIDS USING THE
URINAL, TOTAL OF 1700ML CLEAR YELLOW URINE OUT. SKIN UNCHANGED, HAS PIV THAT
FLUSHES WELL, SL. OOB TO CHAIR FOR SEVERAL HOURS THIS AM. WORKED WITH PT BUT
STATES HE ASKED THEM TO COME BACK TOMORROW. 2 SISTERS IN TO VISIT TODAY, BOTH
UPDATED ON POC AND CURRENT CONDITION. FAMILY WAS CONCERNED ABOUT SLIGHTLY
LARGER RIGHT ARM AND SOFT LUMP ON BASE OF PT'S RIGHT NECK. PT DENIES ANY PAIN,
NUMBNESS, TINGLING. PULSES PRESENT IN LIMB, NOTIFIED MD OF ABOVE, ULTRASOUND
ORDERED OF EXTREMITY, NO FINDINGS.

## 2022-12-19 NOTE — NUR
Pt is sitting on a chair and alert. Pt tells me about his medical history, his
current problems and the plan going forward. He talks about his solid family
support, his lack of interest in Voodoo discussion and his mental/emotional
ways of coping with challenging circumstances. I normalize his
experience, explore sources of meaining, purpose and priorities and provide
therapeutic listening and gentle . Pt responds well and shows sings of
being inspired and more settled in his coping skills.

## 2022-12-20 LAB
ANION GAP SERPL CALCULATED.4IONS-SCNC: 5 MMOL/L (ref 6–16)
BUN SERPL-MCNC: 27 MG/DL (ref 8–24)
CALCIUM SERPL-MCNC: 8.6 MG/DL (ref 8.5–10.1)
CHLORIDE SERPL-SCNC: 108 MMOL/L (ref 98–108)
CO2 SERPL-SCNC: 29 MMOL/L (ref 21–32)
CREAT SERPL-MCNC: 1.1 MG/DL (ref 0.6–1.2)
DEPRECATED RDW RBC AUTO: 40.9 FL (ref 35.1–46.3)
ERYTHROCYTE [DISTWIDTH] IN BLOOD BY AUTOMATED COUNT: 12.4 % (ref 11.7–14.2)
GLUCOSE SERPL-MCNC: 98 MG/DL (ref 70–99)
HCT VFR BLD AUTO: 42.7 % (ref 37–53)
HGB BLD-MCNC: 14.5 G/DL (ref 13.5–17.5)
MCHC RBC AUTO-ENTMCNC: 34 G/DL (ref 31.5–36.5)
MCV RBC AUTO: 91 FL (ref 80–100)
NRBC # BLD AUTO: 0 K/MM3 (ref 0–0.02)
NRBC BLD AUTO-RTO: 0 /100 WBC (ref 0–0.2)
PLATELET # BLD AUTO: 213 K/MM3 (ref 150–400)
POTASSIUM SERPL-SCNC: 4 MMOL/L (ref 3.5–5.5)
SODIUM SERPL-SCNC: 142 MMOL/L (ref 136–145)

## 2022-12-20 NOTE — NUR
4280 TODAY, CALLED DR TORRES, MADE AWARE PT WAS UNABLE TO TOLERATE THE CHEST CT,
COULD NOT LAY FLAT FOR PROCEDURE, STATES HE COULDN'T BREATH. PT STATES HE
DOESN'T WANT TO HAVE THIS DIAGNOSTIC FOR HIS RU NECK SOFT TISSUE AREA. WOULD
RATHER NOT GO THROUGH THE PROCEDURE. DR TORRES AWARE

## 2022-12-20 NOTE — NUR
SUMMARY- PT INDEPENDANT AND STEADY ON FEET IN HIS ROOM- A/O X4. RESP EVEN
UNLABORED, OCC BECOMES SOB, OCC WHEEZE. NEBS ADMINISTERED ROUTINE TODAY.
OXYGEN WAS ON THIS AM FOR SOB AND PT TOOK OFF AROUND 1000. RT REPLACED WITH
NEB AROUND 1100. PT HAS KEPT O2 AT 2L AND SATING 96%. FREQ SITTING AT EDGE OF
BED, STATES THIS IS THE MOST COMFORTABLE FOR BREATHING. PT LIKELY DC HOME IN
AM.

## 2022-12-20 NOTE — NUR
SHIFT MOSTLY UNREMARKABLE. PATIENT ARRIVED AT ABOUT 0000, TRANSFER FROM PCU.
SHIFT ASSESSMENT MOSTLY UNREMARKABLE. PATIENT WAS STEADY ON FEET FOR TRANSFER
AND IN GOOD CONDITION. PATIENT DENIED ANY PAIN. AOX4. CALL LIGHT LEFT WITHIN
REACH.

## 2022-12-21 NOTE — NUR
SHIFT MOSTLY UNREMARKABLE. PATIENT TOOK 2100 MEDICATIONS WITHOUT DIFFICULTY
AND SLEPT THROUGH MOST OF REST OF SHIFT. PATIENT WORE NC THROUGHOUT NIGHT.
REQUESTED BREATHING TREATMENT ONE TIME. PERIODICALLY SAT ON EDGE OF BED TO
BREATHE MORE COMFORTABLY. OFFERED TO SCHEDULE CT SCAN TODAY BEFORE DISCHARGE
BUT PATIENT DECLINED AND WANTED TO CANCEL CT. NOTIFIED SCHEDULING. CALL LIGHT
LEFT WITHIN REACH.

## 2022-12-21 NOTE — NUR
DISCHARGE INSTRUCTIONS COMPLETED AND DISCUSSED WITH PT EXPRESSING
UNDERSTANDING.  SCRIPTS FAXED TO [facility].  E[facility] HERE WITH O2 AND SISTER
HERE TO PICK PT UP.  TO [facility] VIA W/C.